# Patient Record
(demographics unavailable — no encounter records)

---

## 2024-12-17 NOTE — ADDENDUM
[FreeTextEntry1] : I, MONSE ROMERO, acted solely as a scribe for Dr. Liu Sanchez on this date 12/17/2024.  All medical record entries made by the Scribe were at my, Dr. Liu Sanchez, direction and personally dictated by me on 12/17/2024. I have reviewed the chart and agree that the record accurately reflects my personal performance of the history, physical exam, assessment and plan. I have also personally directed, reviewed, and agreed with the chart.

## 2024-12-17 NOTE — HISTORY OF PRESENT ILLNESS
[de-identified] : 57 year old RHD female presents complaining of left shoulder pain. She states she woke up and was not able to move her left shoulder. The patient cannot attribute her pain to any injury, fall, or trauma. She states she immediately started doing range of motion exercises. She states the pain was severe. She states she only plays tennis 3x a week. Patient denies that she has any numbness or tingling.  Patient denies taking any medication to manage her pain.

## 2024-12-17 NOTE — DISCUSSION/SUMMARY
[de-identified] : I went over the pathophysiology of the patient's symptoms in great detail with the patient. I discussed the underlying pathophysiology of the patient's condition in great detail with the patient. I went over the patient's x-rays with them in great detail. At this time, she will start a course of physical therapy for strengthening and flexibility. A prescription was provided. We discussed the use of ice, Tylenol and anti-inflammatories to relieve pain.  We are trying to avoid a cortisone shot and if we jump on this early hopefully we can avoid that  All of their questions were answered. They understand and consent to the plan.   FU in 4-6 weeks.

## 2024-12-17 NOTE — PHYSICAL EXAM
[de-identified] : Constitutional o Appearance : well-nourished, well developed, alert, in no acute distress  Head and Face o Head :  Inspection : atraumatic, normocephalic o Face :  Inspection : no visible rash or discoloration Respiratory o Respiratory Effort: breathing unlabored  Neurologic o Sensation : Normal sensation  Psychiatric o Mood and Affect: mood normal, affect appropriate  Lymphatic o Additional Nodes : No palpable lymph nodes present   Cervical Spine o Inspection/Palpation :  Inspection : alignment midline, normal degree of lordosis present  Skin : normal appearance, no masses or tenderness, trachea midline  Palpation : musculature is nontender to palpation o Range of Motion : arc of motion full in all planes, no crepitance or pain with ROM o Tests: Negative Spurlings test  Right Upper Extremity o Right Shoulder :  Inspection/Palpation : no tenderness, no swelling or deformities  Range of Motion : full and painless in all planes, no crepitance  Strength : forward elevation 5/5, IR 5/5, ER 5/5, ER at 90 of abduction 5/5, supraspinatus 5/5, adduction 5/5, abduction 5/5, biceps/triceps 5/5,  5/5  Stability : no joint instability on provocative testing   Tests: Tran negative, Neer negative, Maxime negative, drop arm test negative, Henrietta's test negative  Left Upper Extremity o Left Shoulder :  Inspection/Palpation : anterior capsular tenderness, no swelling or deformities  Range of Motion : forward flexion to 110, full ER, limited rotation right side worse than left, abduction to 90 degrees, no crepitance  Strength : forward elevation 5/5, IR 5/5, ER 5/5, ER at 90 of abduction 5/5, supraspinatus 5/5, adduction 5/5, abduction 5/5, biceps/triceps 5/5,  5/5  Stability : no joint instability on provocative testing   Tests: Tran negative, Neer negative, Maxime negative, drop arm test negative, Henrietta's test negative  Gait and Station: Gait: gait normal, no significant extremity swelling or lymphedema, good proprioception and balance  Radiology Results 12/17/2024 o Left Shoulder: AP internal/external rotation and outlet views were obtained and revealed no significant degenerative arthritis or significant calcification

## 2025-01-16 NOTE — HISTORY OF PRESENT ILLNESS
[de-identified] : 57 year old RHD female presents for a follow-up evaluation of left shoulder pain. She started physical therapy before the holidays. She has only done two weeks of physical therapy. She states she is 30% better at this time. She was not given exercises to do at home. She states the internal rotation has improved. Patient denies that she has any numbness or tingling. She has some discomfort when reaching out to the side. She denies that the pain wakes her from sleep. Patient denies taking any medication to manage her pain.   Radiology Results 12/17/2024 o Left Shoulder: AP internal/external rotation and outlet views were obtained and revealed no significant degenerative arthritis or significant calcification  Medications

## 2025-01-16 NOTE — DISCUSSION/SUMMARY
[de-identified] : I went over the pathophysiology of the patient's symptoms in great detail with the patient. I am recommending the patient continues to go to physical therapy to obtain increases in their strength and mobility. A prescription was provided. We discussed the use of ice, Tylenol and anti-inflammatories to relieve pain. At-home stretching exercises were discussed and demonstrated with the patient.   All of their questions were answered. They understand and consent to the plan.   FU in one month. If she does not improve we will consider a cortisone injection upon return.

## 2025-01-16 NOTE — ADDENDUM
[FreeTextEntry1] : I, MONSE ROMERO, acted solely as a scribe for Dr. Liu Sanchez on this date 01/16/2025.  All medical record entries made by the Scribe were at my, Dr. Liu Sanchez, direction and personally dictated by me on 01/16/2025. I have reviewed the chart and agree that the record accurately reflects my personal performance of the history, physical exam, assessment and plan. I have also personally directed, reviewed, and agreed with the chart.

## 2025-01-16 NOTE — PHYSICAL EXAM
[de-identified] : Constitutional o Appearance : well-nourished, well developed, alert, in no acute distress  Head and Face o Head :  Inspection : atraumatic, normocephalic o Face :  Inspection : no visible rash or discoloration Respiratory o Respiratory Effort: breathing unlabored  Neurologic o Sensation : Normal sensation  Psychiatric o Mood and Affect: mood normal, affect appropriate  Lymphatic o Additional Nodes : No palpable lymph nodes present   Cervical Spine o Inspection/Palpation :  Inspection : alignment midline, normal degree of lordosis present  Skin : normal appearance, no masses or tenderness, trachea midline  Palpation : musculature is nontender to palpation o Range of Motion : arc of motion full in all planes, no crepitance or pain with ROM o Tests: Negative Spurlings test  Right Upper Extremity o Right Shoulder :  Inspection/Palpation : no tenderness, no swelling or deformities  Range of Motion : full and painless in all planes, no crepitance  Strength : forward elevation 5/5, IR 5/5, ER 5/5, ER at 90 of abduction 5/5, supraspinatus 5/5, adduction 5/5, abduction 5/5, biceps/triceps 5/5,  5/5  Stability : no joint instability on provocative testing   Tests: Tran negative, Neer negative, Maxime negative, drop arm test negative, [place]'s test negative  Left Upper Extremity o Left Shoulder :  Inspection/Palpation : anterior capsular tenderness, no swelling or deformities  Range of Motion : forward flexion lacks about 10 degrees full ER, full internal rotation,  no crepitance  Strength : forward elevation 5/5, IR 5/5, ER 4-/5, ER at 90 of abduction 4-/5, supraspinatus 4-/5, adduction 5/5, abduction 5/5, biceps/triceps 5/5,  5/5  Stability : no joint instability on provocative testing   Tests: Tran negative, Neer negative, Maxime negative, drop arm test negative, [place]'s test negative  Gait and Station: Gait: gait normal, no significant extremity swelling or lymphedema, good proprioception and balance

## 2025-01-24 NOTE — HISTORY OF PRESENT ILLNESS
[de-identified] : 57-year-old RHD female presents for evaluation of left shoulder pain that began in December without any injury. She has been seeing Dr. Sanchez who started her on PT which had been helping, but reports pain worsened yesterday. She complains of constant sharp pain worse with any ROM. She has tried Aleve with mild relief. She is interested in a cortisone injection for her shoulder today.

## 2025-01-24 NOTE — DISCUSSION/SUMMARY
[de-identified] : The patient has left shoulder tendinitis and adhesive capsulitis.  I have discussed the pathology and natural history with her.  She is interested in a subacromial injection today. Informed consent is obtained. Site and procedure were confirmed with the patient.  Following a sterile prep with alcohol an injection is placed into the subacromial space of the left shoulder. The injection consists of 1 cc of Depo-Medrol 40 mg/cc and 8 cc of 1% Xylocaine. The injection was well tolerated. Instructions were given. She will continue with physical therapy and follow-up with Dr. Sanchez.

## 2025-01-24 NOTE — PHYSICAL EXAM
[Rad] : radial 2+ and symmetric bilaterally [Normal] : Alert and in no acute distress [Poor Appearance] : well-appearing [Acute Distress] : not in acute distress [Obese] : not obese [de-identified] : The patient has no respiratory distress. Mood and affect are normal. The patient is alert and oriented to person, place and time. Examination of the cervical spine demonstrates no tenderness, no deformity and no muscle spasm. Cervical spine rotation is 60 to the right, 60 to the left, 75 of extension and 45 of flexion. Neurologic exam of the upper extremities reveals intact sensation to light touch. Motor function is 5 over 5 in all groups. Deep tendon reflexes are 2+ and equal at the biceps, triceps and brachioradialis. Examination of the left shoulder demonstrates no deformity. The skin is intact. There is no erythema. There is tenderness anteriorly. Impingement sign is positive There is no instability. Drop arm test is negative. Empty can test is negative. [place] test is negative. [place] test is negative.  She has active elevation of 30 degrees and passive elevation to 135 degrees.  She has external rotation of 50 degrees passively.  Her elbow is stable.  There is no lymphedema.

## 2025-02-26 NOTE — HISTORY OF PRESENT ILLNESS
[de-identified] : 57-year-old RHD female presents for evaluation of left shoulder pain that began in December without any injury. She notes her left shoulder has improved. Patient denies that she has any numbness or tingling. Patient denies taking any medication to manage her pain. She denies that the pain wakes her from sleep. She has been going to physical therapy and has one more session. She will get an extensive home exercise program at her last session.

## 2025-02-26 NOTE — ADDENDUM
[FreeTextEntry1] : I, MONSE ROMERO, acted solely as a scribe for Dr. Liu Sanchez on this date 02/26/2025.  All medical record entries made by the Scribe were at my, Dr. Liu Sanchez, direction and personally dictated by me on 02/26/2025. I have reviewed the chart and agree that the record accurately reflects my personal performance of the history, physical exam, assessment and plan. I have also personally directed, reviewed, and agreed with the chart.

## 2025-02-26 NOTE — PHYSICAL EXAM
[Rad] : radial 2+ and symmetric bilaterally [Normal] : Alert and in no acute distress [Poor Appearance] : well-appearing [Acute Distress] : not in acute distress [Obese] : not obese [de-identified] : Constitutional o Appearance : well-nourished, well developed, alert, in no acute distress  Head and Face o Head :  Inspection : atraumatic, normocephalic o Face :  Inspection : no visible rash or discoloration Respiratory o Respiratory Effort: breathing unlabored  Neurologic o Sensation : Normal sensation  Psychiatric o Mood and Affect: mood normal, affect appropriate  Lymphatic o Additional Nodes : No palpable lymph nodes present   Cervical Spine o Inspection/Palpation :  Inspection : alignment midline, normal degree of lordosis present  Skin : normal appearance, no masses or tenderness, trachea midline  Palpation : musculature is nontender to palpation o Range of Motion : arc of motion full in all planes, no crepitance or pain with ROM o Tests: Negative Spurlings test  Right Upper Extremity o Right Shoulder :  Inspection/Palpation : no tenderness, no swelling or deformities  Range of Motion : full and painless in all planes, no crepitance  Strength : forward elevation 5/5, IR 5/5, ER 5/5, ER at 90 of abduction 5/5, supraspinatus 5/5, adduction 5/5, abduction 5/5, biceps/triceps 5/5,  5/5  Stability : no joint instability on provocative testing   Tests: Rtan negative, Neer negative, Maxime negative, drop arm test negative, [place]'s test negative  Left Upper Extremity o Left Shoulder :  Inspection/Palpation : no tenderness, no swelling or deformities  Range of Motion : full forward flexion, full IR and ER,  no crepitance  Strength : forward elevation 5/5, IR 5/5, ER 5/5, ER at 90 of abduction 5/5, supraspinatus 5/5, adduction 5/5, abduction 5/5, biceps/triceps 5/5,  5/5  Stability : no joint instability on provocative testing   Tests: Tran negative, Neer negative, Maxime negative, drop arm test negative, [place]'s test negative  Gait and Station: Gait: gait normal, no significant extremity swelling or lymphedema, good proprioception and balance

## 2025-03-19 NOTE — HISTORY OF PRESENT ILLNESS
[Post-hospitalization from ___ Hospital] : Post-hospitalization from [unfilled] Hospital [Discharge Summary] : discharge summary [Discharge Med List] : discharge medication list [Admitted on: ___] : The patient was admitted on [unfilled] [Discharged on ___] : discharged on [unfilled] [FreeTextEntry2] : partial sbo in hospital 2 day no ng  tube low fiber diet

## 2025-03-19 NOTE — PLAN
[FreeTextEntry1] : resolution of sbo low fiber diet lower thyroid 6 days per week routine f/u chart/ images and labs note reviewed from hospitalization [place] [facility]

## 2025-03-19 NOTE — PHYSICAL EXAM
[Normal] : soft, non-tender, non-distended, no masses palpated, no HSM and normal bowel sounds [81979 - Moderate Complexity requires multiple possible diagnoses and/or the management options, moderate complexity of the medical data (tests, etc.) to be reviewed, and moderate risk of significant complications, morbidity, and/or mortality as well as co] : Moderate Complexity

## 2025-04-02 NOTE — DATA REVIEWED
[FreeTextEntry1] : 03/11/2025 TSH 0.29 (0.4 to 4.5) HbA1c - 5.9%  November 2020 TPO antibodies 11 (<9) Thyroglobulin antibodies neg

## 2025-04-02 NOTE — ASSESSMENT
[FreeTextEntry1] : Target: TSH in the normal range for the testing lab.  Last TSH was below goal so I decreased the dose of levothyroxine.  Patient has prediabetes and is on metformin.  Last TSH - March 2025 - suppressed Last HbA1c - March 2025 - 5.9%   Plan: 1. Decrease synthroid (brand name) 75 micrograms po daily 2. Continue metformin 500 micrograms po daily 3. Labs to be done in 2 months - see below 4. Follow up in 2 months to review results - telehealth visit ok

## 2025-04-02 NOTE — HISTORY OF PRESENT ILLNESS
Krystina Callahan is here today for Follow-up (3 month )    Concerns/symptoms: Pt is at clinic for 3 month follow up, discuss with PCP about Rx for  Pain and heartburn are not working. Pt  States lefts eyes is runny.   Medications: medications verified and updated  Refills needed today? Yes, medications pended     Tobacco history: verified  Advanced Directives: No not on file, not interested.  BP greater than 140/90? No    Patient would like communication of their results via:      Cell Phone:   Telephone Information:   Mobile [phone]     Okay to leave a message containing results? Yes  Preferred language:  English.    Health Maintenance Due   Topic Date Due   • Shingles Vaccine (1 of 2) Never done   • Pneumococcal Vaccine 65+ (2 of 4 - PPSV23) 11/01/2018   • Osteoporosis Screening  Never done   • Medicare Wellness Visit  04/08/2021   • DM/CKD Microalbumin  06/23/2021      Patient is due for topics listed above, she wishes to proceed with Immunization(s) Pneumococcal, but is not proceeding with Immunization(s) Shingles at this time. The following has occurred: Orders placed for Immunization(s) Pneumococcal..    Medicare HRA:              PHQ 2:  Date Adult PHQ 2 Score Adult PHQ 2 Interpretation   1/22/2021 0 No further screening needed       PHQ 9:  Date Adult PHQ 9 Score Adult PHQ 9 Interpretation   5/4/2020 7 Mild Depression   ,   [FreeTextEntry1] : Problems: 1. Primary hypothyroidism due to Hashimoto's thyroiditis 2. Prediabetes  Primary hypothyroidism due to Hashimoto's thyroiditis 1. Diagnosed in 2015 with primary hypothyroidism due to Hashimoto's thyroiditis Patient never had thyroid surgery/radioactive iodine therapy. Patient had radiation for colon cancer in the past November 2020 - TPO antibodies 11 (<9), Thyroglobulin antibodies neg 2. No family history of thyroid disorders or thyroid cancer. Patient had radiation for colon cancer in the past 3. Medications: Synthroid (brand name) 100 micrograms po daily - fully compliant and patient advised on the appropriate use of levothyroxine  Prediabetes 1. Diagnosed in 2020 with prediabetes 2. No obstructive sleep apnea 3. Meds: No pancreatitis, no personal or family history of medullary thyroid cancer - patient is obese also - she does not want to use GLP-1 agonists.  Metformin 500 mg po daily - patient has diarrhea with higher doses of metformin.

## 2025-04-02 NOTE — PHYSICAL EXAM
[de-identified] : General: No distress, well nourished Eyes: Normal Sclera, EOMI, PERRL ENT: Normal appearance of the nose, normal oropharynx Neck/Thyroid: No cervical lymphadenopathy, thyroid gland 20 g in size, no thyroid nodules, non-tender Respiratory: No use of accessory muscles of respiration, vesicular breath sounds heard bilaterally, no crepitations or rhonchi Cardiovascular: S1 and S2 heard and normal, no S3 or S4, no murmurs, radial pulse normal bilaterally Abdomen: soft, non-tender, no masses, normal bowel sounds Musculoskeletal: No swelling or deformities of joints of hands, no pedal edema Neurological: Normal range of motion in the hands, Normal brachioradialis reflexes bilaterally Psychiatry: Patient converses normally, good judgement and insight Skin: No rashes in hands, no nodules palpated in hands

## 2025-05-28 NOTE — PHYSICAL EXAM
[de-identified] : General: No distress, well nourished Eyes: Normal external appearance ENT: Normal appearance of the nose Neck/Thyroid: No visible neck swelling Respiratory: No use of accessory muscles of respiration Psychiatry: Patient converses normally, good judgement and insight Skin: No rashes seen on face

## 2025-05-28 NOTE — HISTORY OF PRESENT ILLNESS
[FreeTextEntry1] : Problems: 1. Primary hypothyroidism due to Hashimoto's thyroiditis 2. Prediabetes  Primary hypothyroidism due to Hashimoto's thyroiditis 1. Diagnosed in 2015 with primary hypothyroidism due to Hashimoto's thyroiditis Patient never had thyroid surgery/radioactive iodine therapy. Patient had radiation for colon cancer in the past November 2020 - TPO antibodies 11 (<9), Thyroglobulin antibodies neg 2. No family history of thyroid disorders or thyroid cancer. Patient had radiation for colon cancer in the past 3. Medications: Synthroid (brand name) 75 micrograms po daily - fully compliant and patient advised on the appropriate use of levothyroxine  Prediabetes 1. Diagnosed in 2020 with prediabetes 2. No obstructive sleep apnea 3. Meds: No pancreatitis, no personal or family history of medullary thyroid cancer - patient is obese also - she does not want to use GLP-1 agonists.  Metformin 500 mg po daily - patient has diarrhea with higher doses of metformin.

## 2025-05-28 NOTE — ASSESSMENT
[FreeTextEntry1] : Target: TSH in the normal range for the testing lab.  Last TSH was at goal.   Patient has prediabetes and is on metformin.  Last TSH - May 2025 - N Last HbA1c - March 2025 - 5.9%   Plan: 1. Continue synthroid (brand name) 75 micrograms po daily 2. Continue metformin 500 micrograms po daily 3. Labs to be done in 3 months - see below 4. Follow up in  3 months to review results - telehealth visit ok

## 2025-05-28 NOTE — REASON FOR VISIT
[Home] : at home, [unfilled] , at the time of the visit. [Medical Office: ([facility])___] : at the medical office located in  [Telehealth (audio & video)] : This visit was provided via telehealth using real-time 2-way audio visual technology. [Follow - Up] : a follow-up visit [Hypothyroidism] : hypothyroidism [Other___] : [unfilled]

## 2025-06-24 NOTE — PHYSICAL EXAM
[Normal] : normal rate, regular rhythm, normal S1 and S2 and no murmur heard [30378 - Moderate Complexity requires multiple possible diagnoses and/or the management options, moderate complexity of the medical data (tests, etc.) to be reviewed, and moderate risk of significant complications, morbidity, and/or mortality as well as co] : Moderate Complexity

## 2025-06-24 NOTE — HISTORY OF PRESENT ILLNESS
[Post-hospitalization from ___ Hospital] : Post-hospitalization from [unfilled] Hospital [Admitted on: ___] : The patient was admitted on [unfilled] [Discharged on ___] : discharged on [unfilled] [Discharge Summary] : discharge summary [Discharge Med List] : discharge medication list [FreeTextEntry2] : Overnight hospitalization for possible sbo ct scan possible enteritits

## 2025-07-18 NOTE — DISCUSSION/SUMMARY
[Hyperlipidemia] : hyperlipidemia [Essential Hypertension] : essential hypertension [Responding to Treatment] : responding to treatment [None] : There are no changes in medication management [Paroxysmal Atrial Tachycardia] : paroxysmal atrial tachycardia [___ Month(s)] : in [unfilled] month(s) [FreeTextEntry1] : 54 year old with hx of type II DM. elevated cholesterol, remote ablation for AVNRT and remote colon cancer.  presents with atypical chest pain. normal ECG  plan stress echo to r/o ischemia, assess functional status and  BP response to exercise and recurrent ectopy.  7/18/2025  no new sx since last visit.  BP is controlled.. denies SVT events.  needs lab work repeated echo and carotid doppler. [EKG obtained to assist in diagnosis and management of assessed problem(s)] : EKG obtained to assist in diagnosis and management of assessed problem(s)

## 2025-07-18 NOTE — HISTORY OF PRESENT ILLNESS
[FreeTextEntry1] :   Patient is a 54 year old female with hx of AVNRT, s/p ablation.  elevated cholesterol, type II diabetes (recently diagnosed).  she has been having episodes of atypical mid sternal or left chest wall pain.  does not feel with activity.  usually notes at rest? less after lowered coffee intake. no cough, chills, dyspnea, no edema.  mammograms are up to date.  does not smoke, rare alcohol Nkda surgery  hx of achalasia, s/p surgery 1999..last EGD in June bilat breast reduction colon cancer 2003,, chemo, radiation, ileostomy with reversal.  7/18/2025  first visit since 2022.. no new sx or complaints. just felt it was time to come BP is controlled. no new cardiac testing.  meds reviewed tchol 198  trgc 104 hdl 64 cmp etc all drawn after pt was just discharged s/p enteritis.

## 2025-07-24 NOTE — PHYSICAL EXAM
[Normal] : Gait: normal [Goldstein's Sign] : negative Goldstein's sign [Pronator Drift] : negative pronator drift [SLR] : negative straight leg raise [de-identified] : 5 out of 5 motor strength, sensation is intact and symmetrical full range of motion flexion extension and rotation, no palpatory tenderness full range of motion of hips knees shoulders and elbows (all four extremities), no atrophy, negative straight leg raise, no pathological reflexes, no swelling, normal ambulation, no apparent distress skin is intact, no palpable lymph nodes, no upper or lower extremity instability, alert and oriented x3 and normal mood. Normal finger-to nose test. Positive Tran of the right shoulder.   [de-identified] : XR AP Lat Cervical 07/24/2025 - mild degenerative disc disease -reviewed with patient.

## 2025-07-24 NOTE — HISTORY OF PRESENT ILLNESS
[de-identified] : LORENA DICKERSON is a 57 year-old female patient presents for an initial evaluation of neck stiffness since 7/19/25. Denies injury. She states she woke up with the stiffness and notes she has limited ROM. The pain radiates to the right periscapular region down the RUE, to the axilla, tricep and to the elbow. She complains more of arm pain than neck pain. Denies numbness/tingling. Denies weakness.  She tried aleve, advil but no relief. PMHx: HTN, pre-DM, history of colon cancer in 2003 s/p RT/chemo/surgery   No fever chills sweats nausea vomiting no bowel or bladder dysfunction, no recent weight loss or gain no night pain. This history is in addition to the intake form that I personally reviewed.       [Stable] : stable

## 2025-07-24 NOTE — ADDENDUM
[FreeTextEntry1] : This note was written by Joe Muniz on 07/24/2025 acting as scribe for Dr. Raymon Leong M.D.   I, Raymon Leong MD, have read and attest that all the information, medical decision making and discharge instructions within are true and accurate.

## 2025-07-24 NOTE — PHYSICAL EXAM
[Normal] : Gait: normal [Goldstein's Sign] : negative Goldstein's sign [Pronator Drift] : negative pronator drift [SLR] : negative straight leg raise [de-identified] : 5 out of 5 motor strength, sensation is intact and symmetrical full range of motion flexion extension and rotation, no palpatory tenderness full range of motion of hips knees shoulders and elbows (all four extremities), no atrophy, negative straight leg raise, no pathological reflexes, no swelling, normal ambulation, no apparent distress skin is intact, no palpable lymph nodes, no upper or lower extremity instability, alert and oriented x3 and normal mood. Normal finger-to nose test. Positive Tran of the right shoulder.   [de-identified] : XR AP Lat Cervical 07/24/2025 - mild degenerative disc disease -reviewed with patient.

## 2025-07-24 NOTE — DISCUSSION/SUMMARY
[de-identified] : Mild cervical degenerative disc disease Right shoulder tendonitis All options discussed. Referral provided today for physical therapy to improve neck and right shoulder pain. Rx provided today for Diclofenac. All options discussed including rest, medicine, home exercise, acupuncture, Chiropractic care, Physical Therapy, Pain management, and last resort surgery. All questions were answered, all alternatives discussed and the patient is in complete agreement with the treatment plan which the patient contributed to and discussed with me through the shared decision-making process. Follow-up appointment as instructed. Any issues and the patient will call or come in sooner.

## 2025-07-24 NOTE — DISCUSSION/SUMMARY
[de-identified] : Mild cervical degenerative disc disease Right shoulder tendonitis All options discussed. Referral provided today for physical therapy to improve neck and right shoulder pain. Rx provided today for Diclofenac. All options discussed including rest, medicine, home exercise, acupuncture, Chiropractic care, Physical Therapy, Pain management, and last resort surgery. All questions were answered, all alternatives discussed and the patient is in complete agreement with the treatment plan which the patient contributed to and discussed with me through the shared decision-making process. Follow-up appointment as instructed. Any issues and the patient will call or come in sooner.

## 2025-07-24 NOTE — HISTORY OF PRESENT ILLNESS
[de-identified] : LORENA DICKERSON is a 57 year-old female patient presents for an initial evaluation of neck stiffness since 7/19/25. Denies injury. She states she woke up with the stiffness and notes she has limited ROM. The pain radiates to the right periscapular region down the RUE, to the axilla, tricep and to the elbow. She complains more of arm pain than neck pain. Denies numbness/tingling. Denies weakness.  She tried aleve, advil but no relief. PMHx: HTN, pre-DM, history of colon cancer in 2003 s/p RT/chemo/surgery   No fever chills sweats nausea vomiting no bowel or bladder dysfunction, no recent weight loss or gain no night pain. This history is in addition to the intake form that I personally reviewed.       [Stable] : stable